# Patient Record
Sex: MALE | Race: WHITE | NOT HISPANIC OR LATINO | Employment: FULL TIME | URBAN - METROPOLITAN AREA
[De-identification: names, ages, dates, MRNs, and addresses within clinical notes are randomized per-mention and may not be internally consistent; named-entity substitution may affect disease eponyms.]

---

## 2024-05-22 ENCOUNTER — OFFICE VISIT (OUTPATIENT)
Dept: URGENT CARE | Facility: URGENT CARE | Age: 34
End: 2024-05-22
Payer: COMMERCIAL

## 2024-05-22 VITALS
DIASTOLIC BLOOD PRESSURE: 82 MMHG | OXYGEN SATURATION: 98 % | HEART RATE: 58 BPM | TEMPERATURE: 97.4 F | RESPIRATION RATE: 14 BRPM | SYSTOLIC BLOOD PRESSURE: 123 MMHG

## 2024-05-22 DIAGNOSIS — M67.432 GANGLION CYST OF WRIST, LEFT: Primary | ICD-10-CM

## 2024-05-22 PROCEDURE — 99203 OFFICE O/P NEW LOW 30 MIN: CPT | Performed by: FAMILY MEDICINE

## 2024-05-22 NOTE — PATIENT INSTRUCTIONS
Naproxen 2 tablets twice daily for a week to reduce pain and swelling.    Can follow up with orthopedic hand specialist if desired.

## 2024-05-22 NOTE — PROGRESS NOTES
ICD-10-CM    1. Ganglion cyst of wrist, left  M67.432         Likely exacerbated by recent canoe trip activities.    PLAN:  Discussed diagnosis with patient and offered referral for consult with hand surgeon to learn more about excision of the cyst.  Pt would like to hold off on scheduling any ortho visits at this time.    Patient Instructions   Naproxen 2 tablets twice daily for a week to reduce pain and swelling.    Can follow up with orthopedic hand specialist if desired.      SUBJECTIVE:  David Ford is a 34 year old male who presents to  today with L wrist pain for a few days along with a swollen area on the L wrist that is not something he's noticed before.  Was recently on a 30+ mile [employer] canoe trip and noticed some L wrist soreness shortly after that.  He also noticed a swelling on the dorsal wrist when he flexes the wrist.  Has not noticed this before.  No numbness in the hand.  Discomfort is worst with wrist flexion and extension when forearm is pronated.    OBJECTIVE:  /82   Pulse 58   Temp 97.4  F (36.3  C) (Tympanic)   Resp 14   SpO2 98%   GEN: well-appearing, in NAD  EXT: L wrist with 1 cm ganglion cyst on the dorsal aspect, no erythema, minimal tenderness, no ecchymosis.  Normal strength and sensation in the hand and wrist.  Negative Finkelstein.

## 2024-10-13 ENCOUNTER — OFFICE VISIT (OUTPATIENT)
Dept: URGENT CARE | Facility: URGENT CARE | Age: 34
End: 2024-10-13
Payer: COMMERCIAL

## 2024-10-13 VITALS
OXYGEN SATURATION: 98 % | TEMPERATURE: 97.4 F | SYSTOLIC BLOOD PRESSURE: 116 MMHG | WEIGHT: 136.2 LBS | HEART RATE: 63 BPM | DIASTOLIC BLOOD PRESSURE: 78 MMHG

## 2024-10-13 DIAGNOSIS — K13.79 UVULAR SWELLING: Primary | ICD-10-CM

## 2024-10-13 DIAGNOSIS — R07.0 THROAT PAIN: ICD-10-CM

## 2024-10-13 LAB
DEPRECATED S PYO AG THROAT QL EIA: NEGATIVE
GROUP A STREP BY PCR: NOT DETECTED

## 2024-10-13 PROCEDURE — 87651 STREP A DNA AMP PROBE: CPT | Performed by: PHYSICIAN ASSISTANT

## 2024-10-13 PROCEDURE — 99213 OFFICE O/P EST LOW 20 MIN: CPT | Performed by: PHYSICIAN ASSISTANT

## 2024-10-13 RX ORDER — PREDNISONE 20 MG/1
40 TABLET ORAL DAILY
Qty: 10 TABLET | Refills: 0 | Status: SHIPPED | OUTPATIENT
Start: 2024-10-13 | End: 2024-10-18

## 2024-10-13 NOTE — PROGRESS NOTES
SUBJECTIVE:  David Ford is a 34 year old male who comes in with a 1 day history of sore throat.  Patient states that yesterday his throat started to feel sore but today seems much worse.  States that his uvula feels swollen and very raw.  Feels like he is choking on it at times.  Not had any high fevers.  Unsure of any exposure but does have a child in .  There is no significant cough or cold symptoms.  Still able to eat and drink but painful.  Otherwise at baseline health.    No past medical history on file.  There is no problem list on file for this patient.    No current outpatient medications on file.     No current facility-administered medications for this visit.     Social History     Socioeconomic History    Marital status:      Spouse name: Not on file    Number of children: Not on file    Years of education: Not on file    Highest education level: Not on file   Occupational History    Not on file   Tobacco Use    Smoking status: Never    Smokeless tobacco: Never   Substance and Sexual Activity    Alcohol use: Not Currently    Drug use: Never    Sexual activity: Not on file   Other Topics Concern    Not on file   Social History Narrative    Not on file     Social Determinants of Health     Financial Resource Strain: Not At Risk (9/11/2023)    Received from [employer] [facility]    Financial Resource Strain     Is it hard for you to pay for the very basics like food, housing, medical care or heating?: No   Food Insecurity: Not At Risk (9/11/2023)    Received from [employer] [facility]    Food Insecurity     Does your food run out before you have the money to buy more?: No   Transportation Needs: Not At Risk (9/11/2023)    Received from [employer] [facility]    Transportation Needs     Does a lack of transportation keep you from your medical appointments or from getting your medications?: No   Physical Activity: Not on file   Stress: Not on file   Social Connections: Not on file   Interpersonal Safety: Not on  file   Housing Stability: Not on file     ROS  negative other than stated above    Exam:  GENERAL APPEARANCE: healthy, alert and no distress  EYES: EOMI,  PERRL  HENT: TMs and canals clear bilaterally.  Oral mucosa moist with erythema noted.  Uvula erythematous with swelling noted.  Midline no evidence for abscess and no exudate noted  NECK: no adenopathy, no asymmetry, masses, or scars and thyroid normal to palpation  RESP: lungs clear to auscultation - no rales, rhonchi or wheezes  CV: regular rates and rhythm, normal S1 S2, no S3 or S4 and no murmur, click or rub -  SKIN: no suspicious lesions or rashes    Results for orders placed or performed in visit on 10/13/24   Streptococcus A Rapid Screen w/Reflex to PCR - Clinic Collect     Status: Normal    Specimen: Throat; Swab   Result Value Ref Range    Group A Strep antigen Negative Negative     assessment/plan:  (K13.79) Uvular swelling  (primary encounter diagnosis)  Comment:   Plan: predniSONE (DELTASONE) 20 MG tablet        Patient with a 1 day history of uvular swelling and erythema.  There is no evidence for strep.  Tonsillitis or abscess.  Handling oral secretions well.  Appears to be viral in nature and advised over-the-counter med as needed for pain.  Will give burst of prednisone to help with swelling.  Side effects were reviewed.  Offered lidocaine gel and declines at this time.  Follow-up as needed.  Continue to push fluids    (R07.0) Throat pain  Comment:   Plan: Streptococcus A Rapid Screen w/Reflex to PCR -         Clinic Collect, Group A Streptococcus PCR         Throat Swab

## 2025-06-16 ENCOUNTER — ANCILLARY PROCEDURE (OUTPATIENT)
Dept: GENERAL RADIOLOGY | Facility: CLINIC | Age: 35
End: 2025-06-16
Attending: NURSE PRACTITIONER
Payer: COMMERCIAL

## 2025-06-16 ENCOUNTER — OFFICE VISIT (OUTPATIENT)
Dept: URGENT CARE | Facility: URGENT CARE | Age: 35
End: 2025-06-16
Payer: COMMERCIAL

## 2025-06-16 VITALS
SYSTOLIC BLOOD PRESSURE: 128 MMHG | WEIGHT: 137.5 LBS | DIASTOLIC BLOOD PRESSURE: 71 MMHG | BODY MASS INDEX: 19.69 KG/M2 | HEIGHT: 70 IN | OXYGEN SATURATION: 97 % | TEMPERATURE: 98.7 F | RESPIRATION RATE: 16 BRPM | HEART RATE: 71 BPM

## 2025-06-16 DIAGNOSIS — R10.12 LUQ ABDOMINAL PAIN: ICD-10-CM

## 2025-06-16 DIAGNOSIS — R10.12 LUQ ABDOMINAL PAIN: Primary | ICD-10-CM

## 2025-06-16 DIAGNOSIS — K59.00 CONSTIPATION, UNSPECIFIED CONSTIPATION TYPE: ICD-10-CM

## 2025-06-16 LAB
ALBUMIN SERPL BCG-MCNC: 4.9 G/DL (ref 3.5–5.2)
ALP SERPL-CCNC: 75 U/L (ref 40–150)
ALT SERPL W P-5'-P-CCNC: 18 U/L (ref 0–70)
ANION GAP SERPL CALCULATED.3IONS-SCNC: 13 MMOL/L (ref 7–15)
AST SERPL W P-5'-P-CCNC: 25 U/L (ref 0–45)
BASOPHILS # BLD AUTO: 0 10E3/UL (ref 0–0.2)
BASOPHILS NFR BLD AUTO: 1 %
BILIRUB SERPL-MCNC: 0.9 MG/DL
BUN SERPL-MCNC: 13.6 MG/DL (ref 6–20)
CALCIUM SERPL-MCNC: 9.6 MG/DL (ref 8.8–10.4)
CHLORIDE SERPL-SCNC: 101 MMOL/L (ref 98–107)
CREAT SERPL-MCNC: 0.98 MG/DL (ref 0.67–1.17)
EGFRCR SERPLBLD CKD-EPI 2021: >90 ML/MIN/1.73M2
EOSINOPHIL # BLD AUTO: 0.1 10E3/UL (ref 0–0.7)
EOSINOPHIL NFR BLD AUTO: 2 %
ERYTHROCYTE [DISTWIDTH] IN BLOOD BY AUTOMATED COUNT: 12.1 % (ref 10–15)
GLUCOSE SERPL-MCNC: 94 MG/DL (ref 70–99)
HCO3 SERPL-SCNC: 26 MMOL/L (ref 22–29)
HCT VFR BLD AUTO: 44.5 % (ref 40–53)
HGB BLD-MCNC: 15.7 G/DL (ref 13.3–17.7)
IMM GRANULOCYTES # BLD: 0 10E3/UL
IMM GRANULOCYTES NFR BLD: 0 %
LIPASE SERPL-CCNC: 45 U/L (ref 13–60)
LYMPHOCYTES # BLD AUTO: 2.1 10E3/UL (ref 0.8–5.3)
LYMPHOCYTES NFR BLD AUTO: 37 %
MCH RBC QN AUTO: 30.4 PG (ref 26.5–33)
MCHC RBC AUTO-ENTMCNC: 35.3 G/DL (ref 31.5–36.5)
MCV RBC AUTO: 86 FL (ref 78–100)
MONOCYTES # BLD AUTO: 0.5 10E3/UL (ref 0–1.3)
MONOCYTES NFR BLD AUTO: 8 %
NEUTROPHILS # BLD AUTO: 3 10E3/UL (ref 1.6–8.3)
NEUTROPHILS NFR BLD AUTO: 53 %
PLATELET # BLD AUTO: 215 10E3/UL (ref 150–450)
POTASSIUM SERPL-SCNC: 4 MMOL/L (ref 3.4–5.3)
PROT SERPL-MCNC: 7.9 G/DL (ref 6.4–8.3)
RBC # BLD AUTO: 5.17 10E6/UL (ref 4.4–5.9)
SODIUM SERPL-SCNC: 140 MMOL/L (ref 135–145)
WBC # BLD AUTO: 5.6 10E3/UL (ref 4–11)

## 2025-06-16 PROCEDURE — 80053 COMPREHEN METABOLIC PANEL: CPT | Performed by: NURSE PRACTITIONER

## 2025-06-16 PROCEDURE — 86364 TISS TRNSGLTMNASE EA IG CLAS: CPT | Performed by: NURSE PRACTITIONER

## 2025-06-16 PROCEDURE — 85025 COMPLETE CBC W/AUTO DIFF WBC: CPT | Performed by: NURSE PRACTITIONER

## 2025-06-16 PROCEDURE — 3074F SYST BP LT 130 MM HG: CPT | Performed by: NURSE PRACTITIONER

## 2025-06-16 PROCEDURE — 3078F DIAST BP <80 MM HG: CPT | Performed by: NURSE PRACTITIONER

## 2025-06-16 PROCEDURE — 83690 ASSAY OF LIPASE: CPT | Performed by: NURSE PRACTITIONER

## 2025-06-16 PROCEDURE — 36415 COLL VENOUS BLD VENIPUNCTURE: CPT | Performed by: NURSE PRACTITIONER

## 2025-06-16 PROCEDURE — 99214 OFFICE O/P EST MOD 30 MIN: CPT | Performed by: NURSE PRACTITIONER

## 2025-06-16 PROCEDURE — 74019 RADEX ABDOMEN 2 VIEWS: CPT | Mod: TC | Performed by: STUDENT IN AN ORGANIZED HEALTH CARE EDUCATION/TRAINING PROGRAM

## 2025-06-16 NOTE — PATIENT INSTRUCTIONS
Post gastroenteritis IBS constipation gas pains on xr  CBC without overwhelming infection inflammation blood loss anemia  Other labs pending  Medications to soften stools-  Mg citrate MgCalm 1Tbs  Psyllium (Metamucil) powder, wafer or capsule 1-3 times daily  Methylcelluolse (Citrucel) capsules or powder 1-3 times daily  Polyethylene glycol (MiraLax) powder daily prune juice 1/2 cap start with  Docusate sodium (Colace, Dulcolax) liquid or capsule  Drink plenty of water.  Increase fiber in diet- Foods with high fiber content include:  dates, prunes, strawberries, apple with skin, pear with skin, green beans, broccoli, brussles sprouts, carrots, peas, spinach, zucchini, baked beans, kidney beans, peanuts, bran muffins, oatmeal, oat bran, wheat bran and rolled oats.  PCP for follow-up if lingering  ER if symptoms worsen, you develop a high fever, increased abdominal pain, bloody urination, develops lethargy, bloody stools, fainting, low back pain, vomiting

## 2025-06-16 NOTE — PROGRESS NOTES
"Chief Complaint   Patient presents with    Abdominal Pain     Start 1-2 weeks sx left side under rib cage, when sitting it is more uncomfortable/tension, sx worsening, having regular BM  hx possible food poisoning a week ago, mother has celiac has upcoming appointment  tx      SUBJECTIVE:  David Ford is a 35 year old male who  presents today with 1 week of LUQ abdominal discomfort following stomach flu 2-3 weeks ago, constipation, BM today. Abdomen is nontender, nondistended, afebrile, no blood black, n/v/d, no cough shortness of breath pleurisy. Worse sensation when sitting upright. Declines any relevant PMH or surgical history. [place] had been running more preceeding symptoms. Wife and kids are asymptomatic. Declines overuse to tylenol ibuprofen alcohol recent antibiotics.    No past medical history on file.  No current outpatient medications on file.     Social History     Tobacco Use    Smoking status: Never    Smokeless tobacco: Never   Substance Use Topics    Alcohol use: Not Currently     No Known Allergies    Review of Systems   ROS: 10 point ROS neg other than the symptoms noted above in the HPI.    OBJECTIVE:  /71   Pulse 71   Temp 98.7  F (37.1  C)   Resp 16   Ht 1.778 m (5' 10\")   Wt 62.4 kg (137 lb 8 oz)   SpO2 97%   BMI 19.73 kg/m      Physical Exam  Vitals reviewed.   Constitutional:       Appearance: Normal appearance.   HENT:      Head: Normocephalic and atraumatic.      Nose: Nose normal.      Mouth/Throat:      Mouth: Mucous membranes are moist.      Pharynx: Oropharynx is clear.   Eyes:      Extraocular Movements: Extraocular movements intact.      Conjunctiva/sclera: Conjunctivae normal.      Pupils: Pupils are equal, round, and reactive to light.   Cardiovascular:      Rate and Rhythm: Normal rate.      Pulses: Normal pulses.   Pulmonary:      Effort: Pulmonary effort is normal. No respiratory distress.      Breath sounds: No stridor. No " wheezing, rhonchi or rales.   Chest:      Chest wall: No tenderness.   Abdominal:      General: Abdomen is flat. Bowel sounds are normal. There is no distension.      Palpations: Abdomen is soft. There is no mass.      Tenderness: There is no abdominal tenderness. There is no right CVA tenderness, left CVA tenderness, guarding or rebound.      Hernia: No hernia is present.   Musculoskeletal:         General: Normal range of motion.      Cervical back: Normal range of motion and neck supple.   Skin:     General: Skin is warm and dry.      Coloration: Skin is not jaundiced or pale.      Findings: No rash.   Neurological:      General: No focal deficit present.      Mental Status: He is alert and oriented to person, place, and time.      Motor: No weakness.      Gait: Gait normal.   Psychiatric:         Mood and Affect: Mood normal.         Behavior: Behavior normal.       Xray done in clinic read by me as positive for moderate stool burden and gas in the LUQ abdomen, no free air obstruction.  Results for orders placed or performed in visit on 06/16/25   CBC with platelets and differential     Status: None   Result Value Ref Range    WBC Count 5.6 4.0 - 11.0 10e3/uL    RBC Count 5.17 4.40 - 5.90 10e6/uL    Hemoglobin 15.7 13.3 - 17.7 g/dL    Hematocrit 44.5 40.0 - 53.0 %    MCV 86 78 - 100 fL    MCH 30.4 26.5 - 33.0 pg    MCHC 35.3 31.5 - 36.5 g/dL    RDW 12.1 10.0 - 15.0 %    Platelet Count 215 150 - 450 10e3/uL    % Neutrophils 53 %    % Lymphocytes 37 %    % Monocytes 8 %    % Eosinophils 2 %    % Basophils 1 %    % Immature Granulocytes 0 %    Absolute Neutrophils 3.0 1.6 - 8.3 10e3/uL    Absolute Lymphocytes 2.1 0.8 - 5.3 10e3/uL    Absolute Monocytes 0.5 0.0 - 1.3 10e3/uL    Absolute Eosinophils 0.1 0.0 - 0.7 10e3/uL    Absolute Basophils 0.0 0.0 - 0.2 10e3/uL    Absolute Immature Granulocytes 0.0 <=0.4 10e3/uL   CBC with platelets and differential     Status: None    Narrative    The following orders were  created for panel order CBC with platelets and differential.  Procedure                               Abnormality         Status                     ---------                               -----------         ------                     CBC with platelets and ...[[id number]]                      Final result                 Please view results for these tests on the individual orders.     ASSESSMENT:    ICD-10-CM    1. LUQ abdominal pain  R10.12 CBC with platelets and differential     Comprehensive metabolic panel (BMP + Alb, Alk Phos, ALT, AST, Total. Bili, TP)     Lipase     Tissue transglutaminase perez IgA and IgG     XR Abdomen 2 Views     CBC with platelets and differential     Comprehensive metabolic panel (BMP + Alb, Alk Phos, ALT, AST, Total. Bili, TP)     Lipase     Tissue transglutaminase perez IgA and IgG      2. Constipation, unspecified constipation type  K59.00         PLAN:     Post gastroenteritis IBS constipation gas pains on xr  CBC without overwhelming infection inflammation blood loss anemia  Other labs pending  Medications to soften stools-  Mg citrate MgCalm 1Tbs  Psyllium (Metamucil) powder, wafer or capsule 1-3 times daily  Methylcelluolse (Citrucel) capsules or powder 1-3 times daily  Polyethylene glycol (MiraLax) powder daily prune juice 1/2 cap start with  Docusate sodium (Colace, Dulcolax) liquid or capsule  Drink plenty of water.  Increase fiber in diet- Foods with high fiber content include:  dates, prunes, strawberries, apple with skin, pear with skin, green beans, broccoli, brussles sprouts, carrots, peas, spinach, zucchini, baked beans, kidney beans, peanuts, bran muffins, oatmeal, oat bran, wheat bran and rolled oats.  PCP for follow-up if lingering  ER if symptoms worsen, you develop a high fever, increased abdominal pain, bloody urination, develops lethargy, bloody stools, fainting, low back pain, vomiting    Follow up with primary care provider with any problems, questions or  concerns or if symptoms worsen or fail to improve. Patient agreed to plan and verbalized understanding.    Kavita Gallagher, [facility] [place]

## 2025-06-16 NOTE — PROGRESS NOTES
Urgent Care Clinic Visit    Chief Complaint   Patient presents with    Abdominal Pain     Start 1-2 weeks sx left side under rib cage, when sitting it is more uncomfortable/tension, sx worsening, having regular BM  hx possible food poisoning a week ago, mother has celiac has upcoming appointment  tx                6/16/2025    12:37 PM   Additional Questions   Roomed by mf   Accompanied by self

## 2025-06-18 LAB
TTG IGA SER-ACNC: 0.4 U/ML
TTG IGG SER-ACNC: <0.6 U/ML

## 2025-06-22 ENCOUNTER — HEALTH MAINTENANCE LETTER (OUTPATIENT)
Age: 35
End: 2025-06-22